# Patient Record
(demographics unavailable — no encounter records)

---

## 2025-05-01 NOTE — PHYSICAL EXAM
[Murmur] : murmur [Normal] : alert and oriented, normal memory [de-identified] : Thin otherwise well-appearing young white male in no apparent distress [de-identified] : 3/6 harsh blowing systolic murmur [de-identified] : Old midline sternotomy incision well-healed.\par  Old midline sternotomy incision well-healed.\par  Old midline sternotomy incision well-healed regular S1-S2 with an ejection click and a grade 2/6 to 3/6 systolic murmur loudest at the left parasternal border.

## 2025-05-01 NOTE — ASSESSMENT
[FreeTextEntry1] :  EKG: Normal sinus rhythm at 60 bpm, normal axis and intervals  ECG obtained to assist in diagnosis and management of assessed problem(s).  Echocardiogram 12/15/2022: LV size and function are normal with evidence of volume pressure overload LVEF 65% Right ventricle mildly enlarged with mildly reduced RV systolic function Aortic valve is normal Severe pulmonic prosthetic valve stenosis: With moderate PI Peak velocity 4.34 m/s Peak gradient 75 mils mercury Mean gradient 48 mmHg  TYREE ([facility]) 5/2/22: Normal LV size and function. EF 55-60% Mildly reduced RV systolic function Mild to moderate enlargement of right atrium Severe bioprosthetic pulmonic valve stenosis. Peak velocity 3.61m/sec, mean gradient 23 mmHg. Moderately dilated pulmonary artery.  Pt S/P Ross procedure; normally functioning native pulmonic valve in the aortic position.   Echocardiogram 2/10/2022  LV size wall thickness and systolic function are normal.  There is some septal asynchrony. Right ventricle is enlarged and demonstrates mild volume and pressure overload with mild to moderate pressed RV systolic function.  Impression: 25-year-old male with history of congenital valve disease here for cardiac followup to discuss the results of his recent echocardiogram.   1.2022 echocardiogram showed worsening of bioprosthetic pulmonic valve stenosis.-- transvalvular gradient had increased compared to 6 months prior and with right ventricular enlargement and mild dysfunction.  Patient is aware of a general decline in his functional capacity.  Plan: 1.  Will plan an echocardiogram and follow-up with regard to potential implications with regard to replacement if this is  2.  Pt advised to call us if he has any new exertional symptoms.

## 2025-05-01 NOTE — REVIEW OF SYSTEMS
[Negative] : Heme/Lymph [Weight Loss (___ Lbs)] : no recent weight loss [FreeTextEntry2] : Other than as documented here and in the HPI, the thirteen point ROS is negative

## 2025-05-01 NOTE — PHYSICAL EXAM
[Murmur] : murmur [Normal] : alert and oriented, normal memory [de-identified] : Thin otherwise well-appearing young white male in no apparent distress [de-identified] : 3/6 harsh blowing systolic murmur [de-identified] : Old midline sternotomy incision well-healed.\par  Old midline sternotomy incision well-healed.\par  Old midline sternotomy incision well-healed regular S1-S2 with an ejection click and a grade 2/6 to 3/6 systolic murmur loudest at the left parasternal border.

## 2025-05-01 NOTE — REASON FOR VISIT
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  Later underwent a Ross procedure in 2006 with pulmonic valve transplanted to the aortic position. He subsequently developed stenosis of the bioprosthetic homograft that was placed in the pulmonic position. There is limited cardiac history from his pediatric cardiologist (Dr. Adalid Shabazz).   TYREE in May 2022 showed moderate -severe bioprosthetic pulmonic valve stenosis. He was asymptomatic at the time, therefore, the decision was made to repeat a TTE in 6 months  to assess progression of pulmonary valve stenosis and RV function.   The patient denies any history of syncope or presyncope. He believes that there has been a slow but general decline in his functional ability over the course of the last several years.  He did obtain an opinion with Dr. Kali Hampton of adult congenital.  A Holter monitor and a stress test were obtained.  Both were fairly unremarkable.  A CT was planned to size the pulmonic valve area but this was never obtained.  No palpitations PND orthopnea or edema.  He has no other significant medical history He works as an .  He denies hypertension, diabetes, hyperlipidemia or smoking.

## 2025-05-01 NOTE — HISTORY OF PRESENT ILLNESS
[FreeTextEntry1] : Patient denies any new cardiac symptoms. Reports feeling well overall. Denies any chest pain, palpitations or dizziness. Denies any edema, orthopnea or PND.   Has no other significant medical history.

## 2025-05-01 NOTE — ASSESSMENT
[FreeTextEntry1] :  EKG: Normal sinus rhythm at 60 bpm, normal axis and intervals  ECG obtained to assist in diagnosis and management of assessed problem(s).  Echocardiogram 12/15/2022: LV size and function are normal with evidence of volume pressure overload LVEF 65% Right ventricle mildly enlarged with mildly reduced RV systolic function Aortic valve is normal Severe pulmonic prosthetic valve stenosis: With moderate PI Peak velocity 4.34 m/s Peak gradient 75 mils mercury Mean gradient 48 mmHg  TYREE ([facility]) 5/2/22: Normal LV size and function. EF 55-60% Mildly reduced RV systolic function Mild to moderate enlargement of right atrium Severe bioprosthetic pulmonic valve stenosis. Peak velocity 3.61m/sec, mean gradient 23 mmHg. Moderately dilated pulmonary artery.  Pt S/P Ross procedure; normally functioning native pulmonic valve in the aortic position.   Echocardiogram 2/10/2022  LV size wall thickness and systolic function are normal.  There is some septal asynchrony. Right ventricle is enlarged and demonstrates mild volume and pressure overload with mild to moderate pressed RV systolic function.  Impression: 25-year-old male with history of congenital valve disease here for cardiac followup to discuss the results of his recent echocardiogram.   1.2022 echocardiogram showed worsening of bioprosthetic pulmonic valve stenosis.-- transvalvular gradient had increased compared to 6 months prior and with right ventricular enlargement and mild dysfunction.  Patient is aware of a general decline in his functional capacity.  Plan: 1.  Will plan an echocardiogram and follow-up with regard to potential implications with regard to replacement if this is  2.  Pt advised to call us if he has any new exertional symptoms.

## 2025-07-15 NOTE — REASON FOR VISIT
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  Later underwent a Ross procedure in 2006 with pulmonic valve transplanted to the aortic position. He subsequently developed stenosis of the bioprosthetic homograft that was placed in the pulmonic position.  There is limited cardiac history from his pediatric cardiologist (Dr. Adalid Shabazz).   TYREE in May 2022 showed moderate -severe bioprosthetic pulmonic valve stenosis. He was asymptomatic at the time, therefore, the decision was made to repeat a TTE in 6 months  to assess progression of pulmonary valve stenosis and RV function.   The patient denies any history of syncope or presyncope. He believes that there has been a slow but general decline in his functional ability over the course of the last several years.  He did obtain an opinion with Dr. Kali Hampton of adult congenital.  A Holter monitor and a stress test were obtained.  Both were fairly unremarkable.  A CT was planned to size the pulmonic valve area but this was never obtained.  No palpitations PND orthopnea or edema.  He has no other significant medical history He works as an .  He denies hypertension, diabetes, hyperlipidemia or smoking.

## 2025-07-15 NOTE — ASSESSMENT
[FreeTextEntry1] :  EKG: Normal sinus rhythm at 63 bpm,RAD and nl intervals  ECG obtained to assist in diagnosis and management of assessed problem(s).  Echocardiogram 6/5/2025 Normal LV size and function with flattening of the septum in diastole consistent with right ventricular volume overload.  LVEF 65% Right ventricle mildly enlarged with volume overload and low normal function. Aortic valve is well-seated and functions normally. Bioprosthetic PV  demonstrates severe stenosis with a mean gradient of 40 mmHg.  Echocardiogram 12/15/2022: LV size and function are normal with evidence of volume pressure overload LVEF 65% Right ventricle mildly enlarged with mildly reduced RV systolic function Aortic valve is normal Severe pulmonic prosthetic valve stenosis: With moderate PI Peak velocity 4.34 m/s Peak gradient 75 mils mercury Mean gradient 48 mmHg  [facility]REE ([facility]) 5/2/22: Normal LV size and function. EF 55-60% Mildly reduced RV systolic function Mild to moderate enlargement of right atrium Severe bioprosthetic pulmonic valve stenosis. Peak velocity 3.61m/sec, mean gradient 23 mmHg. Moderately dilated pulmonary artery.  Pt S/P Ross procedure; normally functioning native pulmonic valve in the aortic position.   Echocardiogram 2/10/2022  LV size wall thickness and systolic function are normal.  There is some septal asynchrony. Right ventricle is enlarged and demonstrates mild volume and pressure overload with mild to moderate pressed RV systolic function.  Impression: 25-year-old male with history of congenital valve disease here for cardiac followup to discuss the results of his recent echocardiogram.   1.2022 echocardiogram showed worsening of bioprosthetic pulmonic valve stenosis.-- transvalvular gradient had increased compared to 6 months prior and with right ventricular enlargement and mild dysfunction.  Patient is aware of a general decline in his functional capacity.   The current echocardiogram is fairly consistent with that showing severe  prosthetic pulmonic valve stenosis with evidence of RV volume overload.  Plan: 1.    At this point, the patient probably should be referred for consideration of pulmonic valve surgery.  2.   A congenital heart CT to better evaluate the pulmonic valve area was ordered.  3.  I asked the patient to contact me after the CT has been completed and I will regroup with adult congenital cardiology to work out a plan.

## 2025-07-15 NOTE — PHYSICAL EXAM
[Murmur] : murmur [Normal] : alert and oriented, normal memory [de-identified] : Thin otherwise well-appearing young white male in no apparent distress [de-identified] : 3/6 harsh blowing systolic murmur [de-identified] : Old midline sternotomy incision well-healed. Old midline sternotomy incision well-healed. Old midline sternotomy incision well-healed regular S1-S2 with an ejection click and a grade 2/6 to 3/6 systolic murmur loudest at the left parasternal border.

## 2025-07-15 NOTE — ASSESSMENT
[FreeTextEntry1] :  EKG: Normal sinus rhythm at 63 bpm,RAD and nl intervals  ECG obtained to assist in diagnosis and management of assessed problem(s).  Echocardiogram 6/5/2025 Normal LV size and function with flattening of the septum in diastole consistent with right ventricular volume overload.  LVEF 65% Right ventricle mildly enlarged with volume overload and low normal function. Aortic valve is well-seated and functions normally. Bioprosthetic PV  demonstrates severe stenosis with a mean gradient of 40 mmHg.  Echocardiogram 12/15/2022: LV size and function are normal with evidence of volume pressure overload LVEF 65% Right ventricle mildly enlarged with mildly reduced RV systolic function Aortic valve is normal Severe pulmonic prosthetic valve stenosis: With moderate PI Peak velocity 4.34 m/s Peak gradient 75 mils mercury Mean gradient 48 mmHg  TYREE ([facility]) 5/2/22: Normal LV size and function. EF 55-60% Mildly reduced RV systolic function Mild to moderate enlargement of right atrium Severe bioprosthetic pulmonic valve stenosis. Peak velocity 3.61m/sec, mean gradient 23 mmHg. Moderately dilated pulmonary artery.  Pt S/P Ross procedure; normally functioning native pulmonic valve in the aortic position.   Echocardiogram 2/10/2022  LV size wall thickness and systolic function are normal.  There is some septal asynchrony. Right ventricle is enlarged and demonstrates mild volume and pressure overload with mild to moderate pressed RV systolic function.  Impression: 25-year-old male with history of congenital valve disease here for cardiac followup to discuss the results of his recent echocardiogram.   1.2022 echocardiogram showed worsening of bioprosthetic pulmonic valve stenosis.-- transvalvular gradient had increased compared to 6 months prior and with right ventricular enlargement and mild dysfunction.  Patient is aware of a general decline in his functional capacity.   The current echocardiogram is fairly consistent with that showing severe  prosthetic pulmonic valve stenosis with evidence of RV volume overload.  Plan: 1.    At this point, the patient probably should be referred for consideration of pulmonic valve surgery.  2.   A congenital heart CT to better evaluate the pulmonic valve area was ordered.  3.  I asked the patient to contact me after the CT has been completed and I will regroup with adult congenital cardiology to work out a plan.

## 2025-07-15 NOTE — PHYSICAL EXAM
[Murmur] : murmur [Normal] : alert and oriented, normal memory [de-identified] : Thin otherwise well-appearing young white male in no apparent distress [de-identified] : 3/6 harsh blowing systolic murmur [de-identified] : Old midline sternotomy incision well-healed. Old midline sternotomy incision well-healed. Old midline sternotomy incision well-healed regular S1-S2 with an ejection click and a grade 2/6 to 3/6 systolic murmur loudest at the left parasternal border.